# Patient Record
Sex: MALE | Race: BLACK OR AFRICAN AMERICAN | NOT HISPANIC OR LATINO
[De-identification: names, ages, dates, MRNs, and addresses within clinical notes are randomized per-mention and may not be internally consistent; named-entity substitution may affect disease eponyms.]

---

## 2018-02-05 PROBLEM — Z00.00 ENCOUNTER FOR PREVENTIVE HEALTH EXAMINATION: Status: ACTIVE | Noted: 2018-02-05

## 2018-09-17 ENCOUNTER — APPOINTMENT (OUTPATIENT)
Dept: PULMONOLOGY | Facility: CLINIC | Age: 63
End: 2018-09-17

## 2018-10-16 ENCOUNTER — APPOINTMENT (OUTPATIENT)
Dept: PULMONOLOGY | Facility: CLINIC | Age: 63
End: 2018-10-16

## 2020-01-10 ENCOUNTER — APPOINTMENT (OUTPATIENT)
Dept: OPHTHALMOLOGY | Facility: CLINIC | Age: 65
End: 2020-01-10
Payer: COMMERCIAL

## 2020-01-10 ENCOUNTER — NON-APPOINTMENT (OUTPATIENT)
Age: 65
End: 2020-01-10

## 2020-01-10 PROCEDURE — 92002 INTRM OPH EXAM NEW PATIENT: CPT

## 2020-01-10 PROCEDURE — 92136 OPHTHALMIC BIOMETRY: CPT | Mod: LT

## 2020-01-17 ENCOUNTER — NON-APPOINTMENT (OUTPATIENT)
Age: 65
End: 2020-01-17

## 2020-01-17 ENCOUNTER — APPOINTMENT (OUTPATIENT)
Dept: OPHTHALMOLOGY | Facility: CLINIC | Age: 65
End: 2020-01-17
Payer: COMMERCIAL

## 2020-01-17 PROCEDURE — 92014 COMPRE OPH EXAM EST PT 1/>: CPT

## 2020-01-17 PROCEDURE — 76514 ECHO EXAM OF EYE THICKNESS: CPT

## 2020-01-17 PROCEDURE — 92020 GONIOSCOPY: CPT

## 2020-01-17 PROCEDURE — 92083 EXTENDED VISUAL FIELD XM: CPT

## 2020-02-14 ENCOUNTER — NON-APPOINTMENT (OUTPATIENT)
Age: 65
End: 2020-02-14

## 2020-02-14 ENCOUNTER — APPOINTMENT (OUTPATIENT)
Dept: OPHTHALMOLOGY | Facility: CLINIC | Age: 65
End: 2020-02-14
Payer: COMMERCIAL

## 2020-02-14 PROCEDURE — 92133 CPTRZD OPH DX IMG PST SGM ON: CPT

## 2020-02-14 PROCEDURE — 92014 COMPRE OPH EXAM EST PT 1/>: CPT

## 2020-06-12 ENCOUNTER — APPOINTMENT (OUTPATIENT)
Dept: OPHTHALMOLOGY | Facility: CLINIC | Age: 65
End: 2020-06-12
Payer: COMMERCIAL

## 2020-06-12 ENCOUNTER — NON-APPOINTMENT (OUTPATIENT)
Age: 65
End: 2020-06-12

## 2020-06-12 PROCEDURE — 92133 CPTRZD OPH DX IMG PST SGM ON: CPT

## 2020-06-12 PROCEDURE — 92014 COMPRE OPH EXAM EST PT 1/>: CPT

## 2020-10-16 ENCOUNTER — NON-APPOINTMENT (OUTPATIENT)
Age: 65
End: 2020-10-16

## 2020-10-16 ENCOUNTER — APPOINTMENT (OUTPATIENT)
Dept: OPHTHALMOLOGY | Facility: CLINIC | Age: 65
End: 2020-10-16
Payer: COMMERCIAL

## 2020-10-16 PROCEDURE — 92020 GONIOSCOPY: CPT

## 2020-10-16 PROCEDURE — 92083 EXTENDED VISUAL FIELD XM: CPT

## 2020-10-16 PROCEDURE — 92250 FUNDUS PHOTOGRAPHY W/I&R: CPT

## 2020-10-16 PROCEDURE — 92014 COMPRE OPH EXAM EST PT 1/>: CPT

## 2021-01-11 ENCOUNTER — NON-APPOINTMENT (OUTPATIENT)
Age: 66
End: 2021-01-11

## 2021-01-11 ENCOUNTER — APPOINTMENT (OUTPATIENT)
Dept: OPHTHALMOLOGY | Facility: CLINIC | Age: 66
End: 2021-01-11
Payer: COMMERCIAL

## 2021-01-11 PROCEDURE — 92014 COMPRE OPH EXAM EST PT 1/>: CPT

## 2021-01-11 PROCEDURE — 99072 ADDL SUPL MATRL&STAF TM PHE: CPT

## 2021-01-11 PROCEDURE — 92133 CPTRZD OPH DX IMG PST SGM ON: CPT

## 2021-03-01 ENCOUNTER — APPOINTMENT (OUTPATIENT)
Dept: ORTHOPEDIC SURGERY | Facility: CLINIC | Age: 66
End: 2021-03-01
Payer: COMMERCIAL

## 2021-03-01 VITALS
HEIGHT: 68 IN | DIASTOLIC BLOOD PRESSURE: 89 MMHG | WEIGHT: 195 LBS | OXYGEN SATURATION: 97 % | HEART RATE: 82 BPM | SYSTOLIC BLOOD PRESSURE: 158 MMHG | BODY MASS INDEX: 29.55 KG/M2

## 2021-03-01 DIAGNOSIS — Z78.9 OTHER SPECIFIED HEALTH STATUS: ICD-10-CM

## 2021-03-01 DIAGNOSIS — Z87.438 PERSONAL HISTORY OF OTHER DISEASES OF MALE GENITAL ORGANS: ICD-10-CM

## 2021-03-01 DIAGNOSIS — M25.562 PAIN IN LEFT KNEE: ICD-10-CM

## 2021-03-01 DIAGNOSIS — Z86.79 PERSONAL HISTORY OF OTHER DISEASES OF THE CIRCULATORY SYSTEM: ICD-10-CM

## 2021-03-01 PROCEDURE — 99072 ADDL SUPL MATRL&STAF TM PHE: CPT

## 2021-03-01 PROCEDURE — 99203 OFFICE O/P NEW LOW 30 MIN: CPT

## 2021-03-01 PROCEDURE — 73564 X-RAY EXAM KNEE 4 OR MORE: CPT | Mod: LT

## 2021-03-01 RX ORDER — TAMSULOSIN HYDROCHLORIDE 0.4 MG/1
0.4 CAPSULE ORAL
Refills: 0 | Status: ACTIVE | COMMUNITY

## 2021-03-01 RX ORDER — AMLODIPINE BESYLATE AND BENAZEPRIL HYDROCHLORIDE 5; 20 MG/1; MG/1
5-20 CAPSULE ORAL
Refills: 0 | Status: ACTIVE | COMMUNITY

## 2021-03-01 NOTE — PHYSICAL EXAM
[de-identified] : Functional range of motion to both knees including the left side did not reveal any specific deficits.  No acute soft tissue swelling no effusions stable range of motion [de-identified] : X-rays taken of the left knee and AP lateral skyline and open notch views disclose mild varus deformity with minimal joint space narrowing.

## 2021-03-01 NOTE — HISTORY OF PRESENT ILLNESS
[Worsening] : worsening [___ mths] : [unfilled] month(s) ago [2] : a minimum pain level of 2/10 [3] : a maximum pain level of 3/10 [Constant] : ~He/She~ states the symptoms seem to be constant [Walking] : worsened by walking [Knee Flexion] : worsened with knee flexion [Rest] : relieved by rest [de-identified] : Pt presents for initial evaluation with pain in his medial left knee, Pt states there is no known injury, there is no buckling no clicking, he has not taken any pain medication. PCP order xray of his left knee, xray done at [facility] 1/27/2021 of the left knee., pt told to follow up with orthopedist. [de-identified] : certain movements,  exiting car

## 2021-03-01 NOTE — DISCUSSION/SUMMARY
[de-identified] : Patient informed of his findings advised to modify his activity levels accordingly rest and take OTC NSAIDs as tolerated.  Follow-up as needed okay

## 2021-05-21 ENCOUNTER — NON-APPOINTMENT (OUTPATIENT)
Age: 66
End: 2021-05-21

## 2021-05-21 ENCOUNTER — APPOINTMENT (OUTPATIENT)
Dept: OPHTHALMOLOGY | Facility: CLINIC | Age: 66
End: 2021-05-21
Payer: COMMERCIAL

## 2021-05-21 PROCEDURE — 99072 ADDL SUPL MATRL&STAF TM PHE: CPT

## 2021-05-21 PROCEDURE — 92014 COMPRE OPH EXAM EST PT 1/>: CPT

## 2021-05-21 PROCEDURE — 92133 CPTRZD OPH DX IMG PST SGM ON: CPT

## 2021-07-29 ENCOUNTER — EMERGENCY (EMERGENCY)
Facility: HOSPITAL | Age: 66
LOS: 1 days | Discharge: HOME | End: 2021-07-29
Attending: STUDENT IN AN ORGANIZED HEALTH CARE EDUCATION/TRAINING PROGRAM | Admitting: EMERGENCY MEDICINE
Payer: COMMERCIAL

## 2021-07-29 VITALS
TEMPERATURE: 99 F | RESPIRATION RATE: 18 BRPM | SYSTOLIC BLOOD PRESSURE: 140 MMHG | HEART RATE: 77 BPM | DIASTOLIC BLOOD PRESSURE: 96 MMHG | OXYGEN SATURATION: 100 %

## 2021-07-29 LAB
ALBUMIN SERPL ELPH-MCNC: 4.4 G/DL — SIGNIFICANT CHANGE UP (ref 3.3–5)
ALP SERPL-CCNC: 69 U/L — SIGNIFICANT CHANGE UP (ref 40–120)
ALT FLD-CCNC: 18 U/L — SIGNIFICANT CHANGE UP (ref 4–41)
ANION GAP SERPL CALC-SCNC: 13 MMOL/L — SIGNIFICANT CHANGE UP (ref 7–14)
APTT BLD: 28.9 SEC — SIGNIFICANT CHANGE UP (ref 27–36.3)
AST SERPL-CCNC: 29 U/L — SIGNIFICANT CHANGE UP (ref 4–40)
B PERT DNA SPEC QL NAA+PROBE: SIGNIFICANT CHANGE UP
BASOPHILS # BLD AUTO: 0.03 K/UL — SIGNIFICANT CHANGE UP (ref 0–0.2)
BASOPHILS NFR BLD AUTO: 0.6 % — SIGNIFICANT CHANGE UP (ref 0–2)
BILIRUB SERPL-MCNC: 0.4 MG/DL — SIGNIFICANT CHANGE UP (ref 0.2–1.2)
BUN SERPL-MCNC: 12 MG/DL — SIGNIFICANT CHANGE UP (ref 7–23)
C PNEUM DNA SPEC QL NAA+PROBE: SIGNIFICANT CHANGE UP
CALCIUM SERPL-MCNC: 9.3 MG/DL — SIGNIFICANT CHANGE UP (ref 8.4–10.5)
CHLORIDE SERPL-SCNC: 102 MMOL/L — SIGNIFICANT CHANGE UP (ref 98–107)
CO2 SERPL-SCNC: 25 MMOL/L — SIGNIFICANT CHANGE UP (ref 22–31)
CREAT SERPL-MCNC: 0.91 MG/DL — SIGNIFICANT CHANGE UP (ref 0.5–1.3)
EOSINOPHIL # BLD AUTO: 0.17 K/UL — SIGNIFICANT CHANGE UP (ref 0–0.5)
EOSINOPHIL NFR BLD AUTO: 3.1 % — SIGNIFICANT CHANGE UP (ref 0–6)
FLUAV SUBTYP SPEC NAA+PROBE: SIGNIFICANT CHANGE UP
FLUBV RNA SPEC QL NAA+PROBE: SIGNIFICANT CHANGE UP
GLUCOSE SERPL-MCNC: 77 MG/DL — SIGNIFICANT CHANGE UP (ref 70–99)
HADV DNA SPEC QL NAA+PROBE: SIGNIFICANT CHANGE UP
HCOV 229E RNA SPEC QL NAA+PROBE: SIGNIFICANT CHANGE UP
HCOV HKU1 RNA SPEC QL NAA+PROBE: SIGNIFICANT CHANGE UP
HCOV NL63 RNA SPEC QL NAA+PROBE: SIGNIFICANT CHANGE UP
HCOV OC43 RNA SPEC QL NAA+PROBE: SIGNIFICANT CHANGE UP
HCT VFR BLD CALC: 49.5 % — SIGNIFICANT CHANGE UP (ref 39–50)
HGB BLD-MCNC: 15.8 G/DL — SIGNIFICANT CHANGE UP (ref 13–17)
HMPV RNA SPEC QL NAA+PROBE: SIGNIFICANT CHANGE UP
HPIV1 RNA SPEC QL NAA+PROBE: SIGNIFICANT CHANGE UP
HPIV2 RNA SPEC QL NAA+PROBE: SIGNIFICANT CHANGE UP
HPIV3 RNA SPEC QL NAA+PROBE: SIGNIFICANT CHANGE UP
HPIV4 RNA SPEC QL NAA+PROBE: SIGNIFICANT CHANGE UP
IANC: 2.27 K/UL — SIGNIFICANT CHANGE UP (ref 1.5–8.5)
IMM GRANULOCYTES NFR BLD AUTO: 0.2 % — SIGNIFICANT CHANGE UP (ref 0–1.5)
INR BLD: 1.06 RATIO — SIGNIFICANT CHANGE UP (ref 0.88–1.16)
LYMPHOCYTES # BLD AUTO: 2.55 K/UL — SIGNIFICANT CHANGE UP (ref 1–3.3)
LYMPHOCYTES # BLD AUTO: 46.8 % — HIGH (ref 13–44)
MCHC RBC-ENTMCNC: 27.8 PG — SIGNIFICANT CHANGE UP (ref 27–34)
MCHC RBC-ENTMCNC: 31.9 GM/DL — LOW (ref 32–36)
MCV RBC AUTO: 87 FL — SIGNIFICANT CHANGE UP (ref 80–100)
MONOCYTES # BLD AUTO: 0.42 K/UL — SIGNIFICANT CHANGE UP (ref 0–0.9)
MONOCYTES NFR BLD AUTO: 7.7 % — SIGNIFICANT CHANGE UP (ref 2–14)
NEUTROPHILS # BLD AUTO: 2.27 K/UL — SIGNIFICANT CHANGE UP (ref 1.8–7.4)
NEUTROPHILS NFR BLD AUTO: 41.6 % — LOW (ref 43–77)
NRBC # BLD: 0 /100 WBCS — SIGNIFICANT CHANGE UP
NRBC # FLD: 0 K/UL — SIGNIFICANT CHANGE UP
PLATELET # BLD AUTO: 224 K/UL — SIGNIFICANT CHANGE UP (ref 150–400)
POTASSIUM SERPL-MCNC: 4.4 MMOL/L — SIGNIFICANT CHANGE UP (ref 3.5–5.3)
POTASSIUM SERPL-SCNC: 4.4 MMOL/L — SIGNIFICANT CHANGE UP (ref 3.5–5.3)
PROT SERPL-MCNC: 8.1 G/DL — SIGNIFICANT CHANGE UP (ref 6–8.3)
PROTHROM AB SERPL-ACNC: 12 SEC — SIGNIFICANT CHANGE UP (ref 10.6–13.6)
RAPID RVP RESULT: SIGNIFICANT CHANGE UP
RBC # BLD: 5.69 M/UL — SIGNIFICANT CHANGE UP (ref 4.2–5.8)
RBC # FLD: 13.1 % — SIGNIFICANT CHANGE UP (ref 10.3–14.5)
RSV RNA SPEC QL NAA+PROBE: SIGNIFICANT CHANGE UP
RV+EV RNA SPEC QL NAA+PROBE: SIGNIFICANT CHANGE UP
SARS-COV-2 RNA SPEC QL NAA+PROBE: SIGNIFICANT CHANGE UP
SODIUM SERPL-SCNC: 140 MMOL/L — SIGNIFICANT CHANGE UP (ref 135–145)
TROPONIN T, HIGH SENSITIVITY RESULT: <6 NG/L — SIGNIFICANT CHANGE UP
WBC # BLD: 5.45 K/UL — SIGNIFICANT CHANGE UP (ref 3.8–10.5)
WBC # FLD AUTO: 5.45 K/UL — SIGNIFICANT CHANGE UP (ref 3.8–10.5)

## 2021-07-29 PROCEDURE — 70551 MRI BRAIN STEM W/O DYE: CPT | Mod: 26

## 2021-07-29 PROCEDURE — 93010 ELECTROCARDIOGRAM REPORT: CPT

## 2021-07-29 PROCEDURE — 70496 CT ANGIOGRAPHY HEAD: CPT | Mod: 26

## 2021-07-29 PROCEDURE — 99220: CPT | Mod: 25

## 2021-07-29 PROCEDURE — 70498 CT ANGIOGRAPHY NECK: CPT | Mod: 26

## 2021-07-29 RX ORDER — MECLIZINE HCL 12.5 MG
25 TABLET ORAL ONCE
Refills: 0 | Status: COMPLETED | OUTPATIENT
Start: 2021-07-29 | End: 2021-07-29

## 2021-07-29 RX ORDER — AMLODIPINE BESYLATE 2.5 MG/1
5 TABLET ORAL DAILY
Refills: 0 | Status: DISCONTINUED | OUTPATIENT
Start: 2021-07-29 | End: 2021-08-02

## 2021-07-29 RX ORDER — SODIUM CHLORIDE 9 MG/ML
1000 INJECTION INTRAMUSCULAR; INTRAVENOUS; SUBCUTANEOUS ONCE
Refills: 0 | Status: COMPLETED | OUTPATIENT
Start: 2021-07-29 | End: 2021-07-29

## 2021-07-29 RX ORDER — DIAZEPAM 5 MG
5 TABLET ORAL ONCE
Refills: 0 | Status: DISCONTINUED | OUTPATIENT
Start: 2021-07-29 | End: 2021-07-29

## 2021-07-29 RX ADMIN — SODIUM CHLORIDE 1000 MILLILITER(S): 9 INJECTION INTRAMUSCULAR; INTRAVENOUS; SUBCUTANEOUS at 16:02

## 2021-07-29 RX ADMIN — Medication 5 MILLIGRAM(S): at 20:40

## 2021-07-29 RX ADMIN — Medication 25 MILLIGRAM(S): at 16:02

## 2021-07-29 NOTE — ED PROVIDER NOTE - CLINICAL SUMMARY MEDICAL DECISION MAKING FREE TEXT BOX
56 y.o M with PMH HTN, HLD, preDM , glaucoma p/w dizziness, lightheaded and unsteady gait x 2 days. Pt reports he woke up 2 days ago feeling like he was off balance since then his symptoms have not improved he is not able to walk in a straight line. He denies head trauma, nausea, vomiting, fever, chills. He denies numbness or weakness. Pt w/ normal cranial nerve exam and strength but is not able to walk in straight like and gait is unstable, concern for possible stroke, neuro eval, ct head, cta, cbc, cmp, pt.inr, covid pcr, MRI

## 2021-07-29 NOTE — ED CDU PROVIDER INITIAL DAY NOTE - OBJECTIVE STATEMENT
66yM w/pmhx HTN, HLD, pre-DM p/w dizziness and difficulty ambulating x 2 days. Pt states on 7/27 at 4am upon getting out of bed he felt dizzy and off balance with ambulating. He layed back down in bed which relieved symptoms. He reports intermittent dizziness with movement and walking the past 2 days. Today was feeling better, drove to his doctors office and then again felt off balance walking in. He was sent to the ED by his PMD with concern for stroke. Pt denies numbness, tingling, weakness, difficulty speaking or swallowing, headache, cp, sob, palpitations, abd pain, n/v/d, recent travel or illness or any other concerns.  In main ED labs within normal, CTa head and neck without acute findings, pt seen by neuro recommending CDU for MRI, echo with bubble study

## 2021-07-29 NOTE — CONSULT NOTE ADULT - SUBJECTIVE AND OBJECTIVE BOX
Neurology Consultation  Ralph Aguilar, MS4    HPI: Patient is a 65 yo M with pmhx of HTN, HLD, preDM, and Glaucoma presenting with three days of dizziness, lightheadedness, and gait instability. Patient's LKK was 9 PM three days ago. The following morning at 4 AM, the patient stood up from bed but felt lightheaded and dizzy; he immediately lied back down, but his dizziness and lightheadedness persisted for several minutes. He denies any LoC, urination, or sensation that the room is spinning during this or subsequent events. Since then, his symptoms have been fluctuating over the past three days, but he is generally asymptomatic at rest and symptomatic when standing up and walking. In order to walk, he has to move very slowly and either raise up his arms to balance or hold onto a railing. He has never experienced these symptoms before, and is generally able to ambulate independently. His aforementioned symptoms are accompanied by mild headache that resolves at rest. He denies any weakness, numbness/tingling, tremors, diplopia, tinnitus, hearing loss, nausea or vomiting, but he does have difficulty seeing out of his left eye at baseline. He drinks alcohol recreationally and does not use recreational drugs. Has family history of prostate cancer in father but no other cardiac or neurologic family history.    (Stroke only)  NIHSS: 2   MRS: 1  ABCD2: 3    REVIEW OF SYSTEMS: Endorses constipation at baseline. Denies fevers or chills, cough, shortness of breath, chest pain or palpitations,  abdominal or epigastric pain, diarrhea, dysuria, new skin changes.    10-system ROS was performed and is negative except for those items noted above and/or in the HPI.    ALLERGIES/INTOLERANCES:  Allergies  Naprosyn (Rash)    VITALS & EXAMINATION:  Vital Signs Last 24 Hrs  T(C): 37 (29 Jul 2021 13:35), Max: 37 (29 Jul 2021 13:35)  T(F): 98.6 (29 Jul 2021 13:35), Max: 98.6 (29 Jul 2021 13:35)  HR: 64 (29 Jul 2021 16:03) (64 - 77)  BP: 126/85 (29 Jul 2021 16:03) (126/85 - 140/96)  BP(mean): --  RR: 14 (29 Jul 2021 16:03) (14 - 18)  SpO2: 100% (29 Jul 2021 16:03) (100% - 100%)    General:  Constitutional: Male, appears stated age, in no apparent distress including pain  Head: Normocephalic & atraumatic.  Eyes: clear sclera  Extremities: No edema b/l    Neurological:  MS: Awake, alert, oriented to person, place, situation, time. Normal affect. Follows all commands. Cooperative.     Language: Speech is clear, fluent, intact with normal tone/rate/ volume and good repetition, comprehension, registration of words. No perseverance.     CNs: Right pupil does not respond to light, but left pupil does. Right pupil has diminished visual fields peripherally, but left has VFF. EOMI no nystagmus, no diplopia. V1-3 intact to LT/pinprick, well developed masseter muscles b/l. No facial asymmetry b/l, full eye closure strength b/l. Hearing grossly normal (rubbing fingers) b/l. Symmetric palate elevation in midline. Gag reflex deferred. Head turning & shoulder shrug intact b/l.     Motor: Normal muscle bulk & tone. No noticeable tremor or seizure. No pronator drift.              Deltoid	Biceps	Triceps	   R	5	5	5	5		 	  L	5	5	5	5			    	H-Flex	K-Flex	K-Ext	D-Flex	P-Flex  R	5	5	5	5	5		 	   L	5	5	5	5	5		     Sensation: Intact to LT b/l    Reflexes: +2 in biceps and patella b/l, negative babinski sign b/l    Coordination: No dysmetria to FTN or HTS b/l    Gait: Normal Romberg. Normal gait is characterized by small steps, mildly wide based gait, and diminished arm swing. Patient is unable to walk in tandem.     LABORATORY:  CBC                       15.8   5.45  )-----------( 224      ( 29 Jul 2021 16:21 )             49.5     Chem 07-29    140  |  102  |  12  ----------------------------<  77  4.4   |  25  |  0.91    Ca    9.3      29 Jul 2021 16:21    TPro  8.1  /  Alb  4.4  /  TBili  0.4  /  DBili  x   /  AST  29  /  ALT  18  /  AlkPhos  69  07-29    LFTs LIVER FUNCTIONS - ( 29 Jul 2021 16:21 )  Alb: 4.4 g/dL / Pro: 8.1 g/dL / ALK PHOS: 69 U/L / ALT: 18 U/L / AST: 29 U/L / GGT: x           Coagulopathy PT/INR - ( 29 Jul 2021 16:21 )   PT: 12.0 sec;   INR: 1.06 ratio         PTT - ( 29 Jul 2021 16:21 )  PTT:28.9 sec  Lipid Panel   A1c   Cardiac enzymes     U/A   CSF  Other    STUDIES & IMAGING: (EEG, CT, MR, U/S, TTE/SILAS):      7/29 CT Head and CTA Head/Neck IMPRESSION:    Head CT:    No acute intracranial abnormality is noted. If the patient has new and persistent symptoms, consider short interval follow-up head CT or brain MRI follow-up.    CTA head and neck:    No evidence for significant intracranial or extracranial arterial stenosis.   Neurology Consultation  Ralph Aguilar, MS4    HPI: Patient is a 65 yo M with pmhx of HTN, HLD, preDM, and Glaucoma presenting with three days of dizziness, lightheadedness, and gait instability. Patient's LKK was 9 PM three days ago. The following morning at 4 AM, the patient stood up from bed but felt lightheaded and dizzy; he immediately lied back down, but his dizziness and lightheadedness persisted for several minutes. He denies any LoC, urination, or sensation that the room is spinning during this or subsequent events. Since then, his symptoms have been fluctuating over the past three days, but he is generally asymptomatic at rest and symptomatic when standing up and walking. In order to walk, he has to move very slowly and either raise up his arms to balance or hold onto a railing. He has never experienced these symptoms before, and is generally able to ambulate independently. His aforementioned symptoms are accompanied by mild headache that resolves at rest. He denies any weakness, numbness/tingling, tremors, diplopia, tinnitus, hearing loss, nausea or vomiting, but he does have difficulty seeing out of his left eye at baseline. He drinks alcohol recreationally and does not use recreational drugs. Has family history of prostate cancer in father but no other cardiac or neurologic family history.    (Stroke only)  NIHSS: 2   MRS: 1  ABCD2: 3    REVIEW OF SYSTEMS: Endorses constipation at baseline. Denies fevers or chills, cough, shortness of breath, chest pain or palpitations,  abdominal or epigastric pain, diarrhea, dysuria, new skin changes.    10-system ROS was performed and is negative except for those items noted above and/or in the HPI.    ALLERGIES/INTOLERANCES:  Allergies  Naprosyn (Rash)    VITALS & EXAMINATION:  Vital Signs Last 24 Hrs  T(C): 37 (29 Jul 2021 13:35), Max: 37 (29 Jul 2021 13:35)  T(F): 98.6 (29 Jul 2021 13:35), Max: 98.6 (29 Jul 2021 13:35)  HR: 64 (29 Jul 2021 16:03) (64 - 77)  BP: 126/85 (29 Jul 2021 16:03) (126/85 - 140/96)  BP(mean): --  RR: 14 (29 Jul 2021 16:03) (14 - 18)  SpO2: 100% (29 Jul 2021 16:03) (100% - 100%)    General:  Constitutional: Male, appears stated age, in no apparent distress including pain  Head: Normocephalic & atraumatic.  Eyes: clear sclera  Extremities: No edema b/l    Neurological:  MS: Awake, alert, oriented to person, place, situation, time. Normal affect. Follows all commands. Cooperative.     Language: Speech is clear, fluent, intact with normal tone/rate/ volume and good repetition, comprehension, registration of words. No perseverance.     CNs: Left pupil does not respond to light, but right pupil does. Left pupil has diminished visual fields peripherally, but right has VFF. EOMI no nystagmus, no diplopia. V1-3 intact to LT/pinprick, well developed masseter muscles b/l. No facial asymmetry b/l, full eye closure strength b/l. Hearing grossly normal (rubbing fingers) b/l. Symmetric palate elevation in midline. Gag reflex deferred. Head turning & shoulder shrug intact b/l.     Motor: Normal muscle bulk & tone. No noticeable tremor or seizure. No pronator drift.              Deltoid	Biceps	Triceps	   R	5	5	5	5		 	  L	5	5	5	5			    	H-Flex	K-Flex	K-Ext	D-Flex	P-Flex  R	5	5	5	5	5		 	   L	5	5	5	5	5		     Sensation: Intact to LT b/l    Reflexes: +2 in biceps and patella b/l, negative babinski sign b/l    Coordination: No dysmetria to FTN or HTS b/l    Gait: Normal Romberg. Normal gait is characterized by small steps, mildly wide based gait, and diminished arm swing. Patient is unable to walk in tandem.     LABORATORY:  CBC                       15.8   5.45  )-----------( 224      ( 29 Jul 2021 16:21 )             49.5     Chem 07-29    140  |  102  |  12  ----------------------------<  77  4.4   |  25  |  0.91    Ca    9.3      29 Jul 2021 16:21    TPro  8.1  /  Alb  4.4  /  TBili  0.4  /  DBili  x   /  AST  29  /  ALT  18  /  AlkPhos  69  07-29    LFTs LIVER FUNCTIONS - ( 29 Jul 2021 16:21 )  Alb: 4.4 g/dL / Pro: 8.1 g/dL / ALK PHOS: 69 U/L / ALT: 18 U/L / AST: 29 U/L / GGT: x           Coagulopathy PT/INR - ( 29 Jul 2021 16:21 )   PT: 12.0 sec;   INR: 1.06 ratio         PTT - ( 29 Jul 2021 16:21 )  PTT:28.9 sec  Lipid Panel   A1c   Cardiac enzymes     U/A   CSF  Other    STUDIES & IMAGING: (EEG, CT, MR, U/S, TTE/SILAS):      7/29 CT Head and CTA Head/Neck IMPRESSION:    Head CT:    No acute intracranial abnormality is noted. If the patient has new and persistent symptoms, consider short interval follow-up head CT or brain MRI follow-up.    CTA head and neck:    No evidence for significant intracranial or extracranial arterial stenosis.

## 2021-07-29 NOTE — ED PROVIDER NOTE - OBJECTIVE STATEMENT
67 y/o male with a hx of HTN, Glucoma presents to the ER c/o dizziness and unsteady gait x 3 days.  Pt states he woke up with his symptoms, pt reports difficulty walking straight and holding onto things to walk.  Pt denies headache, head trauma, numbness, tingling, chest pain, sob, fevers, chills.  Pt saw his PMD and sent him in for evaluation of possible stoke.  Pt denies hx of vertigo.

## 2021-07-29 NOTE — ED PROVIDER NOTE - ATTENDING CONTRIBUTION TO CARE
56 y.o M with PMH HTN, HLD, preDM , glaucoma p/w dizziness, lightheaded and unsteady gait x 2 days. Pt reports he woke up 2 days ago feeling like he was off balance since then his symptoms have not improved he is not able to walk in a straight line. He denies head trauma, nausea, vomiting, fever, chills. He denies numbness or weakness. Pt states he was having increased trouble with walking where he feels unstable when walking , feels like he needs to hold on for balance. He went to his PMD and was sent here for evaluation for possible stroke.  denies fever, chills, chest pain, SOB, abdominal pain, diarrhea, dysuria, syncope, bleeding, new rash,weakness, numbness, blurred vision  + difficulty with walking   ROS  otherwise negative as per HPI  Gen: Awake, Alert, WD, WN, NAD  Head:  NC/AT  Eyes:  PERRL, ? torsional nystagmus r>L  EOMI, Conjunctiva pink, lids normal, no scleral icterus  ENT: OP clear, no exudates, no erythema, uvula midline, TMs clear bilaterally, moist mucus membranes  Neck: supple, nontender, no meningismus, no JVD, trachea midline  Cardiac/CV:  S1 S2, RRR, no M/G/R  Respiratory/Pulm:  CTAB, good air movement, normal resp effort, no wheezes/stridor/retractions/rales/rhonchi  Gastrointestinal/Abdomen:  Soft, nontender, nondistended, +BS, no rebound/guarding  Back:  no CVAT, no MLT  Ext:  warm, well perfused, moving all extremities spontaneously, no peripheral edema, distal pulses intact  Skin: intact, no rash  Neuro:  AAOx3, sensation intact, motor 5/5 x 4 extremities, ataxic gait , speech clear , unable to walk in straight line

## 2021-07-29 NOTE — ED CDU PROVIDER INITIAL DAY NOTE - MEDICAL DECISION MAKING DETAILS
66yM w/pmhx HTN, HLD, pre-DM p/w dizziness and difficulty ambulating x 2 days. On exam pt has a cautious gait, unable to walk heel to toe. CTa head/neck without acute findings. Pt seen by neuro, recommend CDU for MRI to r/o cva, echo.

## 2021-07-29 NOTE — ED PROVIDER NOTE - PROGRESS NOTE DETAILS
JAZIEL Gordon: pt was seen and evaluated by Neurology advised to obtain MRI brain.  Pt accepted by CDU for JAZIEL Bailey.  Pt is aware and agreeable with plan.

## 2021-07-29 NOTE — ED ADULT NURSE NOTE - OBJECTIVE STATEMENT
patient alert ox4 came in c/o feeling dizzy since few days. denies any HA/CP. connected to CM shows NSR. labs done and meds given as ordered. awaiting results and re eval.

## 2021-07-29 NOTE — CONSULT NOTE ADULT - ATTENDING COMMENTS
66M with pmhx of HTN, HLD, preDM, and Glaucoma presenting with three days of fluctuating dizziness and gait instability that are provoked by standing up or walking and decreased at rest. Denies weakness, numbness/tingling, nausea/vomiting, tremor, diplopia. Exam shows normal FTN and HTS b/l, no nystagmus or skew deviation. mildly unsteady gait, CTH and CTA unremarkable. NIHSS 0  < from: MR Head No Cont (07.29.21 @ 21:16) >    IMPRESSION:  Tiny acute/subacute periaqueductal midbrain infarct. No acute intracranial hemorrhage or vasogenic edema. Mild chronic microvascular change.    < end of copied text >    TTE  Patient has possible Aspirin allergy. can start Plavix 75 mg daily  LDL 67 can hold statin for now  Can follow up with Neurology, Dr. Aydin Mireles at [phone]

## 2021-07-29 NOTE — ED CDU PROVIDER INITIAL DAY NOTE - ATTENDING CONTRIBUTION TO CARE
56 y.o M with PMH HTN, HLD, preDM , glaucoma p/w dizziness, lightheaded and unsteady gait x 2 days. Pt reports he woke up 2 days ago feeling like he was off balance since then his symptoms have not improved he is not able to walk in a straight line. He denies head trauma, nausea, vomiting, fever, chills. He denies numbness or weakness. Pt states he was having increased trouble with walking where he feels unstable when walking , feels like he needs to hold on for balance. He went to his PMD and was sent here for evaluation for possible stroke. Pt in ed had ct cta which were negative was given meclizine w/ mild improvement of his symptoms and was placed in cdu for MRI   denies fever, chills, chest pain, SOB, abdominal pain, diarrhea, dysuria, syncope, bleeding, new rash,weakness, numbness, blurred vision  + difficulty with walking   ROS  otherwise negative as per HPI  Gen: Awake, Alert, WD, WN, NAD  Head:  NC/AT  Eyes:  PERRL, ? torsional nystagmus r>L  EOMI, Conjunctiva pink, lids normal, no scleral icterus  ENT: OP clear, no exudates, no erythema, uvula midline, TMs clear bilaterally, moist mucus membranes  Neck: supple, nontender, no meningismus, no JVD, trachea midline  Cardiac/CV:  S1 S2, RRR, no M/G/R  Respiratory/Pulm:  CTAB, good air movement, normal resp effort, no wheezes/stridor/retractions/rales/rhonchi  Gastrointestinal/Abdomen:  Soft, nontender, nondistended, +BS, no rebound/guarding  Back:  no CVAT, no MLT  Ext:  warm, well perfused, moving all extremities spontaneously, no peripheral edema, distal pulses intact  Skin: intact, no rash  Neuro:  AAOx3, sensation intact, motor 5/5 x 4 extremities, ataxic gait , speech clear , unable to walk in straight line.  CDU plan of care  MRI  Neuro

## 2021-07-29 NOTE — ED CDU PROVIDER INITIAL DAY NOTE - PHYSICAL EXAMINATION
Neuro: A&Ox3, PERRL, CN II-VII intact, normal finger to nose, normal rapid alternating movements, normal heel to shin, strength 5/5 in all extremities, sensation intact and equal b/l  pt unable to walk heel to toe, +cautious gait

## 2021-07-29 NOTE — CONSULT NOTE ADULT - ASSESSMENT
Patient is 66M with pmhx of HTN, HLD, preDM, and Glaucoma presenting with three days of fluctuating dizziness and gait instability that are provoked by standing up or walking and decreased at rest. Denies weakness, numbness/tingling, nausea/vomiting, tremor, diplopia. Exam shows normal FTN and HTS b/l, no nystagmus or skew deviation. Normal gait characterized by small steps, mildly wide based gait, and diminished arm swing, and patient is unable to walk in tandem. Labs, CTH and CTA unremarkable.    Impression: Patient's dizziness and gait instability, in the absence of other new focal deficits, could possibly localize to the cerebellum. Given normal CTH and CTA, vertebrobasilar insufficiency or stroke are less likely, but MRI is needed to rule it out. Due to lack of nystagmus, peripheral dysequilibrium is less likely.    Recommendations:  -Obtain MRI with and without contrast to rule out posterior circulation stroke  -Recommend fall precautions  -If MRI clear, consider further neurological workup as an outpatient    Case to be discussed with neurology attending Dr. Mireles Patient is 66M with pmhx of HTN, HLD, preDM, and Glaucoma presenting with three days of fluctuating dizziness and gait instability that are provoked by standing up or walking and decreased at rest. Denies weakness, numbness/tingling, nausea/vomiting, tremor, diplopia. Exam shows normal FTN and HTS b/l, no nystagmus or skew deviation. Normal gait characterized by small steps, mildly wide based gait, and diminished arm swing, and patient is unable to walk in tandem. Labs, CTH and CTA unremarkable.    Impression: Patient's dizziness and gait instability, in the absence of other new focal deficits, could possibly localize to the cerebellum. Given normal CTH and CTA, vertebrobasilar insufficiency or stroke are less likely, but MRI is needed to rule it out. Due to lack of nystagmus, peripheral dysequilibrium is less likely.    Recommendations:  -Obtain MRI without contrast to rule out posterior circulation stroke  -TTE with bubble study  -Start Aspirin 81mg PO daily. If MR brain is negative for acute ischemic CVA, will consider discontinuing.  -Atorvastatin as per 10-year ASCVD risk   -Monitor CBC, BMP  -HgbA1C, Lipid panel  -Orthostatic vital signs  -Since symptom onset was >24-48hrs, BP goal of normotension  -BG <180, avoid hypoglycemia  -Recommend fall precautions  -If MRI clear, consider further neurological workup as an outpatient with Dr. Mireles ([phone]) at [address] [place] [address].    Case to be seen and discussed with neurology attending Dr. Mireles.

## 2021-07-30 VITALS
RESPIRATION RATE: 16 BRPM | TEMPERATURE: 98 F | DIASTOLIC BLOOD PRESSURE: 80 MMHG | OXYGEN SATURATION: 100 % | HEART RATE: 67 BPM | SYSTOLIC BLOOD PRESSURE: 141 MMHG

## 2021-07-30 PROCEDURE — 93306 TTE W/DOPPLER COMPLETE: CPT | Mod: 26

## 2021-07-30 PROCEDURE — 99217: CPT

## 2021-07-30 RX ORDER — CLOPIDOGREL BISULFATE 75 MG/1
75 TABLET, FILM COATED ORAL DAILY
Refills: 0 | Status: DISCONTINUED | OUTPATIENT
Start: 2021-07-30 | End: 2021-08-02

## 2021-07-30 RX ORDER — CLOPIDOGREL BISULFATE 75 MG/1
1 TABLET, FILM COATED ORAL
Qty: 30 | Refills: 0
Start: 2021-07-30 | End: 2021-08-28

## 2021-07-30 RX ADMIN — AMLODIPINE BESYLATE 5 MILLIGRAM(S): 2.5 TABLET ORAL at 06:09

## 2021-07-30 NOTE — ED CDU PROVIDER SUBSEQUENT DAY NOTE - PROGRESS NOTE DETAILS
MRI resulted showing "Tiny acute/subacute periaqueductal midbrain infarct", paged neuro to discuss. Spoke with neuro. Called pts wife, states he is allergic to NSAIDs and aspirin. Reports severe allergic reaction after taking Motrin over 10 years ago, throat and facial swelling was seen at [facility] ER. Paged neuro to discuss aspirin vs plavix

## 2021-07-30 NOTE — ED CDU PROVIDER DISPOSITION NOTE - CARE PROVIDER_API CALL
Aydin Mireles)  Neurology  [address] [facility] [address], [address]  [place] [address]  Phone: [phone]  Fax: [phone]  Follow Up Time:

## 2021-07-30 NOTE — ED CDU PROVIDER SUBSEQUENT DAY NOTE - ATTENDING CONTRIBUTION TO CARE
Attending note:   After face to face evaluation of this patient, I concur with above noted hx, pe, and care plan for this patient.  Mckeon: 66 yom with HTN, HLD, DM, presents to ED with vertigo for one day. Pt felt better with treatment in ED but at baseline. Pt placed in CDU for MRI and Neuro eval. MRI shows possible infarct and neuro recommended statin and anti-coag.  Pt's symptoms have resolved and neuro exam is unremarkable. Vitals stable.  Outpatient with PMD followup.

## 2021-07-30 NOTE — ED CDU PROVIDER SUBSEQUENT DAY NOTE - HISTORY
this is a 66 y.o male with a pMhx of HTN, HLD, pre-DM p/w dizziness, presened to the ED complaining of having dizziness earlier today and feeling off balance when ambulating. Patient currently feeling better, does not have any symptoms-he was sent in by his PCP for concerns of stroke. Patient denies having any numbness, tingling, weakness, difficulty speaking, swallowing, headaches, CP, SOB, or palpitations.

## 2021-07-30 NOTE — ED CDU PROVIDER DISPOSITION NOTE - CLINICAL COURSE
This is a 66 y.o male with a PMhx of HTN, HLD, Pre-DM, presented initially to the ED to have dizziness, lightheadedness, and gait instability, pt was found was evaluated by neurologist recommended to follow up with neuro outpatient, patient had MRI done which showed tiny infarct. Patient currently not lightheaded feeling, better currently. denies having any nausea, vomiting, diarrhea, abdominal pain, CP, SOB, MORA, headaches, or dizziness. Patient has been eating and drinking in ED. patient had lab work done with no acute changes.

## 2021-07-30 NOTE — ED CDU PROVIDER SUBSEQUENT DAY NOTE - MEDICAL DECISION MAKING DETAILS
this is a 66 y.o male with a pMhx of HTN, HLD, pre-DM p/w dizziness, presened to the ED complaining of having dizziness earlier today and feeling off balance when ambulating. dizziness-pending MRI and NEURO reassessment.

## 2021-07-30 NOTE — ED CDU PROVIDER DISPOSITION NOTE - PATIENT PORTAL LINK FT
You can access the FollowMyHealth Patient Portal offered by [facility] by registering at the following website: [email or web address]. By joining [employer]’s FollowMyHealth portal, you will also be able to view your health information using other applications (apps) compatible with our system.

## 2021-07-30 NOTE — ED CDU PROVIDER DISPOSITION NOTE - NSFOLLOWUPINSTRUCTIONS_ED_ALL_ED_FT
Rest, drink plenty of fluids.  Advance activity as tolerated.  Continue all previously prescribed medications as directed.  Follow up with your primary care physician in 48-72 hours- bring copies of your results.  Return to the ER for worsening or persistent symptoms, and/or ANY NEW OR CONCERNING SYMPTOMS. If you have issues obtaining follow up, please call: [phone] to obtain a doctor or specialist who takes your insurance in your area.  You may call [phone] to make an appointment with the internal medicine clinic.    Please follow up with Dr. Mireles out patient at [address] [facility], call [phone]

## 2021-09-03 ENCOUNTER — APPOINTMENT (OUTPATIENT)
Dept: OPHTHALMOLOGY | Facility: CLINIC | Age: 66
End: 2021-09-03

## 2021-09-20 PROBLEM — I10 ESSENTIAL (PRIMARY) HYPERTENSION: Chronic | Status: ACTIVE | Noted: 2021-07-29

## 2021-09-20 PROBLEM — E78.5 HYPERLIPIDEMIA, UNSPECIFIED: Chronic | Status: ACTIVE | Noted: 2021-07-29

## 2021-10-05 ENCOUNTER — NON-APPOINTMENT (OUTPATIENT)
Age: 66
End: 2021-10-05

## 2021-10-05 ENCOUNTER — APPOINTMENT (OUTPATIENT)
Dept: OPHTHALMOLOGY | Facility: CLINIC | Age: 66
End: 2021-10-05
Payer: COMMERCIAL

## 2021-10-05 PROCEDURE — 92014 COMPRE OPH EXAM EST PT 1/>: CPT

## 2021-10-05 PROCEDURE — 92133 CPTRZD OPH DX IMG PST SGM ON: CPT

## 2022-03-03 ENCOUNTER — APPOINTMENT (OUTPATIENT)
Dept: GASTROENTEROLOGY | Facility: CLINIC | Age: 67
End: 2022-03-03
Payer: COMMERCIAL

## 2022-03-03 VITALS
BODY MASS INDEX: 28.79 KG/M2 | TEMPERATURE: 97 F | DIASTOLIC BLOOD PRESSURE: 78 MMHG | WEIGHT: 190 LBS | OXYGEN SATURATION: 98 % | SYSTOLIC BLOOD PRESSURE: 122 MMHG | HEIGHT: 68 IN | HEART RATE: 73 BPM

## 2022-03-03 DIAGNOSIS — K59.09 OTHER CONSTIPATION: ICD-10-CM

## 2022-03-03 DIAGNOSIS — Z12.11 ENCOUNTER FOR SCREENING FOR MALIGNANT NEOPLASM OF COLON: ICD-10-CM

## 2022-03-03 DIAGNOSIS — Z80.0 FAMILY HISTORY OF MALIGNANT NEOPLASM OF DIGESTIVE ORGANS: ICD-10-CM

## 2022-03-03 PROCEDURE — 99204 OFFICE O/P NEW MOD 45 MIN: CPT

## 2022-03-03 RX ORDER — POLYETHYLENE GLYCOL 3350 17 G/17G
17 POWDER, FOR SOLUTION ORAL DAILY
Qty: 3 | Refills: 2 | Status: ACTIVE | COMMUNITY
Start: 2022-03-03 | End: 1900-01-01

## 2022-03-03 RX ORDER — SODIUM SULFATE, POTASSIUM SULFATE, MAGNESIUM SULFATE 17.5; 3.13; 1.6 G/ML; G/ML; G/ML
17.5-3.13-1.6 SOLUTION, CONCENTRATE ORAL
Qty: 1 | Refills: 0 | Status: ACTIVE | COMMUNITY
Start: 2022-03-03 | End: 1900-01-01

## 2022-03-03 NOTE — HISTORY OF PRESENT ILLNESS
[de-identified] : 66F hx of HTN, CVA not on a/c, referred for positive FOBT. The patient states he feels well overall. Reports chronic constipation, has a hard bowel movement every 3-4 days. +straining. Denies abd pain, n/v, hematochezia, melena, weight loss. \par \par Last colonoscopy was 5yrs ago, reportedly significant for benign polyps. \par +fhx of colon cancer - sister in age 50s w/ stage IV colon cancer \par

## 2022-03-03 NOTE — ASSESSMENT
[FreeTextEntry1] : 66F hx of HTN, CVA not on a/c, referred for positive FOBT. Patient reports chronic long standing constipation described as hard tool every 3-4 days, +straining. Denies abd pain, n/v, hematochezia, melena, weight loss. Last colonoscopy was 5yrs ago, reportedly significant for benign polyps. +fhx of colon cancer - sister in age 50s w/ stage IV colon cancer.\par \par - start miralax for constipation\par - increase soluble fiber\par - increase hydration\par - schedule colonoscopy for screening purposes -- increased risk due to fhx and personal hx of polyps\par - Risks/benefits discussed in detail\par - prep sent to pharmacy on file \par \par

## 2022-03-03 NOTE — PHYSICAL EXAM
[General Appearance - Alert] : alert [General Appearance - In No Acute Distress] : in no acute distress [Sclera] : the sclera and conjunctiva were normal [Extraocular Movements] : extraocular movements were intact [Neck Cervical Mass (___cm)] : no neck mass was observed [Neck Appearance] : the appearance of the neck was normal [] : no respiratory distress [Exaggerated Use Of Accessory Muscles For Inspiration] : no accessory muscle use [Abdomen Soft] : soft [Abdomen Tenderness] : non-tender [Oriented To Time, Place, And Person] : oriented to person, place, and time [Impaired Insight] : insight and judgment were intact

## 2022-03-08 ENCOUNTER — NON-APPOINTMENT (OUTPATIENT)
Age: 67
End: 2022-03-08

## 2022-03-08 ENCOUNTER — APPOINTMENT (OUTPATIENT)
Dept: OPHTHALMOLOGY | Facility: CLINIC | Age: 67
End: 2022-03-08
Payer: COMMERCIAL

## 2022-03-08 PROCEDURE — 92250 FUNDUS PHOTOGRAPHY W/I&R: CPT

## 2022-03-08 PROCEDURE — 92014 COMPRE OPH EXAM EST PT 1/>: CPT

## 2022-03-18 ENCOUNTER — OUTPATIENT (OUTPATIENT)
Dept: OUTPATIENT SERVICES | Facility: HOSPITAL | Age: 67
LOS: 1 days | End: 2022-03-18
Payer: COMMERCIAL

## 2022-03-18 ENCOUNTER — APPOINTMENT (OUTPATIENT)
Dept: GASTROENTEROLOGY | Facility: HOSPITAL | Age: 67
End: 2022-03-18

## 2022-03-18 ENCOUNTER — RESULT REVIEW (OUTPATIENT)
Age: 67
End: 2022-03-18

## 2022-03-18 VITALS
TEMPERATURE: 97 F | HEART RATE: 65 BPM | RESPIRATION RATE: 18 BRPM | DIASTOLIC BLOOD PRESSURE: 72 MMHG | WEIGHT: 190.04 LBS | HEIGHT: 68 IN | OXYGEN SATURATION: 97 % | SYSTOLIC BLOOD PRESSURE: 118 MMHG

## 2022-03-18 VITALS
OXYGEN SATURATION: 99 % | HEART RATE: 64 BPM | SYSTOLIC BLOOD PRESSURE: 127 MMHG | DIASTOLIC BLOOD PRESSURE: 80 MMHG | RESPIRATION RATE: 15 BRPM

## 2022-03-18 DIAGNOSIS — Z12.11 ENCOUNTER FOR SCREENING FOR MALIGNANT NEOPLASM OF COLON: ICD-10-CM

## 2022-03-18 PROCEDURE — 45385 COLONOSCOPY W/LESION REMOVAL: CPT

## 2022-03-18 PROCEDURE — 45380 COLONOSCOPY AND BIOPSY: CPT | Mod: XS

## 2022-03-18 PROCEDURE — 88305 TISSUE EXAM BY PATHOLOGIST: CPT

## 2022-03-18 PROCEDURE — 45380 COLONOSCOPY AND BIOPSY: CPT | Mod: 59

## 2022-03-18 PROCEDURE — 88305 TISSUE EXAM BY PATHOLOGIST: CPT | Mod: 26

## 2022-03-18 RX ORDER — TIMOLOL 0.5 %
1 DROPS OPHTHALMIC (EYE)
Qty: 0 | Refills: 0 | DISCHARGE

## 2022-03-18 RX ORDER — TAMSULOSIN HYDROCHLORIDE 0.4 MG/1
1 CAPSULE ORAL
Qty: 0 | Refills: 0 | DISCHARGE

## 2022-03-18 RX ORDER — SODIUM CHLORIDE 9 MG/ML
500 INJECTION INTRAMUSCULAR; INTRAVENOUS; SUBCUTANEOUS
Refills: 0 | Status: DISCONTINUED | OUTPATIENT
Start: 2022-03-18 | End: 2022-04-01

## 2022-03-18 RX ORDER — AMLODIPINE BESYLATE AND BENAZEPRIL HYDROCHLORIDE 10; 20 MG/1; MG/1
1 CAPSULE ORAL
Qty: 0 | Refills: 0 | DISCHARGE

## 2022-03-18 NOTE — PRE-ANESTHESIA EVALUATION ADULT - NSANTHOSAYNRD_GEN_A_CORE
No. JEFFRY screening performed.  STOP BANG Legend: 0-2 = LOW Risk; 3-4 = INTERMEDIATE Risk; 5-8 = HIGH Risk

## 2022-03-18 NOTE — ASU PATIENT PROFILE, ADULT - NSICDXPASTMEDICALHX_GEN_ALL_CORE_FT
PAST MEDICAL HISTORY:  Enlarged prostate     HLD (hyperlipidemia)     HTN (hypertension)     Stroke

## 2022-03-18 NOTE — PRE PROCEDURE NOTE - PRE PROCEDURE EVALUATION
Attending Physician:       Eddie Calvillo                      Procedure: Colonoscopy     Indication for Procedure: Screening Colonoscopy   ________________________________________________________  PAST MEDICAL & SURGICAL HISTORY:  HTN (hypertension)    HLD (hyperlipidemia)    Stroke    Enlarged prostate    No significant past surgical history      ALLERGIES:  Naprosyn (Rash)  NSAIDs (Angioedema)    HOME MEDICATIONS:  amlodipine-benazepril 5 mg-20 mg oral capsule: 1 cap(s) orally once a day  tamsulosin 0.4 mg oral capsule: 1 cap(s) orally once a day  timolol hemihydrate 0.5% ophthalmic solution: 1 drop(s) to each affected eye 2 times a day    AICD/PPM: [ ] yes   [x ] no    PERTINENT LAB DATA:                      PHYSICAL EXAMINATION:    Height (cm): 172.7  Weight (kg): 86.2  BMI (kg/m2): 28.9  BSA (m2): 2T(C): 36.2  HR: 65  BP: 118/72  RR: 18  SpO2: 97%    Constitutional: NAD  HEENT: PERRLA, EOMI,    Neck:  No JVD  Respiratory: CTAB/L  Cardiovascular: S1 and S2  Gastrointestinal: BS+, soft, NT/ND  Extremities: No peripheral edema  Neurological: A/O x 3, no focal deficits  Psychiatric: Normal mood, normal affect  Skin: No rashes    ASA Class: I [ ]  II [x ]  III [ ]  IV [ ]    COMMENTS:    The patient is a suitable candidate for the planned procedure unless box checked [ ]  No, explain:

## 2022-03-18 NOTE — ASU PATIENT PROFILE, ADULT - FALL HARM RISK - UNIVERSAL INTERVENTIONS
Bed in lowest position, wheels locked, appropriate side rails in place/Call bell, personal items and telephone in reach/Instruct patient to call for assistance before getting out of bed or chair/Non-slip footwear when patient is out of bed/[place] to call system/Physically safe environment - no spills, clutter or unnecessary equipment/Purposeful Proactive Rounding/Room/bathroom lighting operational, light cord in reach

## 2022-03-21 LAB — SURGICAL PATHOLOGY STUDY: SIGNIFICANT CHANGE UP

## 2022-03-25 ENCOUNTER — NON-APPOINTMENT (OUTPATIENT)
Age: 67
End: 2022-03-25

## 2022-06-23 PROBLEM — I63.9 CEREBRAL INFARCTION, UNSPECIFIED: Chronic | Status: ACTIVE | Noted: 2022-03-18

## 2022-06-23 PROBLEM — N40.0 BENIGN PROSTATIC HYPERPLASIA WITHOUT LOWER URINARY TRACT SYMPTOMS: Chronic | Status: ACTIVE | Noted: 2022-03-18

## 2022-07-18 ENCOUNTER — APPOINTMENT (OUTPATIENT)
Dept: PULMONOLOGY | Facility: CLINIC | Age: 67
End: 2022-07-18

## 2022-07-18 ENCOUNTER — APPOINTMENT (OUTPATIENT)
Age: 67
End: 2022-07-18

## 2022-07-18 VITALS
HEIGHT: 68 IN | TEMPERATURE: 96.1 F | HEART RATE: 72 BPM | DIASTOLIC BLOOD PRESSURE: 85 MMHG | SYSTOLIC BLOOD PRESSURE: 126 MMHG | OXYGEN SATURATION: 96 % | BODY MASS INDEX: 28.79 KG/M2 | WEIGHT: 190 LBS

## 2022-07-18 DIAGNOSIS — G47.33 OBSTRUCTIVE SLEEP APNEA (ADULT) (PEDIATRIC): ICD-10-CM

## 2022-07-18 PROCEDURE — 99203 OFFICE O/P NEW LOW 30 MIN: CPT

## 2022-07-18 PROCEDURE — 95800 SLP STDY UNATTENDED: CPT

## 2022-07-18 NOTE — HISTORY OF PRESENT ILLNESS
[Obstructive Sleep Apnea] : obstructive sleep apnea [Awakes Unrefreshed] : awakes unrefreshed [Awakes with Dry Mouth] : awakes with dry mouth [Frequent Nocturnal Awakening] : frequent nocturnal awakening [Recent  Weight Gain] : recent weight gain [Snoring] : snoring [Witnessed Apneas] : witnessed apneas [TextBox_4] : Patient here for sleep apnea evaluation.  Recently hospitalized for headache and possible CVA.  History of loud snoring witnessed apneas and nonrestorative sleep also reports leg movements according to his wife he extends his toes upward during the night frequently.  Some exertional dyspnea noted\par \par Some exertional dyspnea reported\par \par  [Awakes with Headache] : does not awaken with headache

## 2022-07-18 NOTE — ASSESSMENT
[FreeTextEntry1] : Based on history and physical the patient has a high likelihood of having obstructive sleep apnea. Further assessment by sleep testing is recommended. There is no contraindication to a home sleep study. We will therefore proceed to two night home apnea sleep study for further assessment.\par \par Spirometry ordered the patient did not stay for this follow-up after JEFFRY evaluation completed

## 2022-07-19 PROCEDURE — 95800 SLP STDY UNATTENDED: CPT

## 2022-08-09 ENCOUNTER — APPOINTMENT (OUTPATIENT)
Dept: OPHTHALMOLOGY | Facility: CLINIC | Age: 67
End: 2022-08-09

## 2022-08-15 ENCOUNTER — APPOINTMENT (OUTPATIENT)
Dept: PULMONOLOGY | Facility: CLINIC | Age: 67
End: 2022-08-15

## 2022-09-12 ENCOUNTER — APPOINTMENT (OUTPATIENT)
Dept: PULMONOLOGY | Facility: CLINIC | Age: 67
End: 2022-09-12

## 2022-09-12 DIAGNOSIS — R25.8 OTHER ABNORMAL INVOLUNTARY MOVEMENTS: ICD-10-CM

## 2022-09-12 PROCEDURE — 99213 OFFICE O/P EST LOW 20 MIN: CPT | Mod: 95

## 2022-09-13 PROBLEM — R25.8 NOCTURNAL LEG MOVEMENTS: Status: ACTIVE | Noted: 2022-09-13

## 2022-09-13 NOTE — REASON FOR VISIT
[Home] : at home, [unfilled] , at the time of the visit. [Medical Office: ([facility])___] : at the medical office located in  [Spouse] : spouse [Patient] : the patient [Follow-Up] : a follow-up visit [Sleep Apnea] : sleep apnea

## 2022-09-13 NOTE — HISTORY OF PRESENT ILLNESS
[TextBox_4] : Telehealth visit to follow-up results of home sleep study.  Primary complaint was abnormal leg movements at night leg movements appear to be disturbing to patient's spouse rather than to the patient.

## 2022-09-13 NOTE — ASSESSMENT
[FreeTextEntry1] : Nocturnal leg movements unclear etiology.  No evidence of significant sleep apnea and patient not reporting daytime sleepiness.  Leg movements somewhat distressing to wife but this by itself would not appear to be a strong enough motivation to pursue further work-up will defer to patient's request to not pursue further testing at this point

## 2022-09-26 ENCOUNTER — NON-APPOINTMENT (OUTPATIENT)
Age: 67
End: 2022-09-26

## 2022-09-26 ENCOUNTER — APPOINTMENT (OUTPATIENT)
Dept: OPHTHALMOLOGY | Facility: CLINIC | Age: 67
End: 2022-09-26

## 2022-09-26 PROCEDURE — 92014 COMPRE OPH EXAM EST PT 1/>: CPT

## 2022-09-26 PROCEDURE — 92133 CPTRZD OPH DX IMG PST SGM ON: CPT

## 2023-03-06 ENCOUNTER — NON-APPOINTMENT (OUTPATIENT)
Age: 68
End: 2023-03-06

## 2023-03-06 ENCOUNTER — APPOINTMENT (OUTPATIENT)
Dept: OPHTHALMOLOGY | Facility: CLINIC | Age: 68
End: 2023-03-06
Payer: MEDICARE

## 2023-03-06 PROCEDURE — 92133 CPTRZD OPH DX IMG PST SGM ON: CPT

## 2023-03-06 PROCEDURE — 92014 COMPRE OPH EXAM EST PT 1/>: CPT

## 2023-03-13 NOTE — ASU PREOP CHECKLIST - SURGICAL CONSENT
done [facility] Counseling:  I discussed with the patient the risks of golimumab including but not limited to myelosuppression, immunosuppression, autoimmune hepatitis, demyelinating diseases, lymphoma, and serious infections.  The patient understands that monitoring is required including a PPD at baseline and must alert us or the primary physician if symptoms of infection or other concerning signs are noted.

## 2023-07-07 ENCOUNTER — NON-APPOINTMENT (OUTPATIENT)
Age: 68
End: 2023-07-07

## 2023-07-07 ENCOUNTER — APPOINTMENT (OUTPATIENT)
Dept: OPHTHALMOLOGY | Facility: CLINIC | Age: 68
End: 2023-07-07
Payer: MEDICARE

## 2023-07-07 PROCEDURE — 92133 CPTRZD OPH DX IMG PST SGM ON: CPT

## 2023-07-07 PROCEDURE — 92014 COMPRE OPH EXAM EST PT 1/>: CPT

## 2023-11-30 ENCOUNTER — NON-APPOINTMENT (OUTPATIENT)
Age: 68
End: 2023-11-30

## 2023-11-30 ENCOUNTER — APPOINTMENT (OUTPATIENT)
Dept: OPHTHALMOLOGY | Facility: CLINIC | Age: 68
End: 2023-11-30
Payer: MEDICARE

## 2023-11-30 PROCEDURE — 92014 COMPRE OPH EXAM EST PT 1/>: CPT

## 2023-11-30 PROCEDURE — 92133 CPTRZD OPH DX IMG PST SGM ON: CPT

## 2023-12-28 NOTE — ASU PREOP CHECKLIST - LOOSE TEETH
SHINGRIX is the new, more effective vaccine for Shingles.  It is more than 90% effective.  You should check with your local pharmacy and insurance company for availability and coverage.  It is a 2 shot series, with the second shot given between 2-6 months after the first, and is approved for ages 50 and up.     no

## 2023-12-31 NOTE — ED PROVIDER NOTE - MDM ORDERS SUBMITTED SELECTION
[Alert] : alert [Oriented x 3] : ~L oriented x 3 [FreeTextEntry3] : Unable to assess Labs/EKG/Imaging Studies/Medications

## 2024-04-19 ENCOUNTER — NON-APPOINTMENT (OUTPATIENT)
Age: 69
End: 2024-04-19

## 2024-04-19 ENCOUNTER — APPOINTMENT (OUTPATIENT)
Dept: OPHTHALMOLOGY | Facility: CLINIC | Age: 69
End: 2024-04-19
Payer: MEDICARE

## 2024-04-19 PROCEDURE — 92133 CPTRZD OPH DX IMG PST SGM ON: CPT

## 2024-04-19 PROCEDURE — 92014 COMPRE OPH EXAM EST PT 1/>: CPT

## 2025-01-14 ENCOUNTER — APPOINTMENT (OUTPATIENT)
Dept: OPHTHALMOLOGY | Facility: CLINIC | Age: 70
End: 2025-01-14
Payer: MEDICARE

## 2025-01-14 ENCOUNTER — NON-APPOINTMENT (OUTPATIENT)
Age: 70
End: 2025-01-14

## 2025-01-14 PROCEDURE — 92133 CPTRZD OPH DX IMG PST SGM ON: CPT

## 2025-01-14 PROCEDURE — 92014 COMPRE OPH EXAM EST PT 1/>: CPT

## 2025-05-12 ENCOUNTER — NON-APPOINTMENT (OUTPATIENT)
Age: 70
End: 2025-05-12

## 2025-05-12 ENCOUNTER — APPOINTMENT (OUTPATIENT)
Dept: OPHTHALMOLOGY | Facility: CLINIC | Age: 70
End: 2025-05-12
Payer: MEDICARE

## 2025-05-12 PROCEDURE — 92133 CPTRZD OPH DX IMG PST SGM ON: CPT

## 2025-05-12 PROCEDURE — 92014 COMPRE OPH EXAM EST PT 1/>: CPT

## (undated) DEVICE — FOLEY HOLDER STATLOCK 2 WAY ADULT

## (undated) DEVICE — BIOPSY FORCEP RADIAL JAW 4 STANDARD WITH NEEDLE

## (undated) DEVICE — IRRIGATOR BIO SHIELD

## (undated) DEVICE — TUBING SUCTION CONN 6FT STERILE

## (undated) DEVICE — SENSOR O2 FINGER ADULT 24/CA

## (undated) DEVICE — SNARE OVAL LOOP MICOR

## (undated) DEVICE — SOL INJ NS 0.9% 500ML 2 PORT

## (undated) DEVICE — SUCTION YANKAUER NO CONTROL VENT

## (undated) DEVICE — TUBING IV SET GRAVITY 3Y 100" MACRO

## (undated) DEVICE — SYR ALLIANCE II INFLATION 60ML

## (undated) DEVICE — BALLOON US ENDO

## (undated) DEVICE — POLY TRAP ETRAP

## (undated) DEVICE — TUBING SUCTION 20FT

## (undated) DEVICE — PACK IV START WITH CHG

## (undated) DEVICE — CATH IV SAFE BC 22G X 1" (BLUE)

## (undated) DEVICE — SYR LUER LOK 50CC

## (undated) DEVICE — BRUSH COLONOSCOPY CYTOLOGY

## (undated) DEVICE — CLAMP BX HOT RAD JAW 3

## (undated) DEVICE — CATH IV SAFE BC 20G X 1.16" (PINK)

## (undated) DEVICE — ELCTR GROUNDING PAD ADULT COVIDIEN

## (undated) DEVICE — BITE BLOCK ADULT 20 X 27MM (GREEN)

## (undated) DEVICE — FORCEP RADIAL JAW 4 JUMBO 2.8MM 3.2MM 240CM ORANGE DISP

## (undated) DEVICE — FORCEP RADIAL JAW 4 240CM DISP